# Patient Record
Sex: MALE | Race: WHITE | HISPANIC OR LATINO | Employment: FULL TIME | URBAN - METROPOLITAN AREA
[De-identification: names, ages, dates, MRNs, and addresses within clinical notes are randomized per-mention and may not be internally consistent; named-entity substitution may affect disease eponyms.]

---

## 2021-06-01 ENCOUNTER — RECORDS - HEALTHEAST (OUTPATIENT)
Dept: ADMINISTRATIVE | Facility: CLINIC | Age: 21
End: 2021-06-01

## 2022-11-23 ENCOUNTER — ALLIED HEALTH/NURSE VISIT (OUTPATIENT)
Dept: FAMILY MEDICINE | Facility: CLINIC | Age: 22
End: 2022-11-23

## 2022-11-23 DIAGNOSIS — Z48.02 ENCOUNTER FOR REMOVAL OF SUTURES: Primary | ICD-10-CM

## 2022-11-23 PROCEDURE — 99207 PR NO CHARGE NURSE ONLY: CPT

## 2022-11-23 NOTE — PROGRESS NOTES
Patient presents for suture removal.  Placed on 11/17 and instructed to have removed in 7-10 days.  Today is day 6, reviewed signs and symptoms of infection and assessed wound which was well approximated and without any signs of infection.  Rescheduled patient to return in 2 days for removal.  Patient verbalized understanding.

## 2022-11-25 ENCOUNTER — ALLIED HEALTH/NURSE VISIT (OUTPATIENT)
Dept: FAMILY MEDICINE | Facility: CLINIC | Age: 22
End: 2022-11-25
Payer: COMMERCIAL

## 2022-11-25 DIAGNOSIS — Z48.02 ENCOUNTER FOR REMOVAL OF SUTURES: Primary | ICD-10-CM

## 2022-11-25 PROCEDURE — 99207 PR NO CHARGE NURSE ONLY: CPT

## 2022-11-25 NOTE — PROGRESS NOTES
Chacho Baltazarero presents to the clinic for removal of sutures and sutures,staples, steri strips. The patient has had sutures in place for 8 days. There has been no patient reported signs or symptoms of infection or drainage. 3  sutures and sutures,staples, staple, steri strips are seen and located on the right anterior elbow. Tetanus status is up to date. All sutures and sutures,staples, steri strips were easily removed today. Routine wound care discussed by the RN or provider. The patient will follow up as needed.

## 2024-02-19 ENCOUNTER — OFFICE VISIT (OUTPATIENT)
Dept: FAMILY MEDICINE | Facility: CLINIC | Age: 24
End: 2024-02-19

## 2024-02-19 VITALS
RESPIRATION RATE: 18 BRPM | SYSTOLIC BLOOD PRESSURE: 108 MMHG | HEART RATE: 66 BPM | OXYGEN SATURATION: 99 % | WEIGHT: 144.7 LBS | DIASTOLIC BLOOD PRESSURE: 70 MMHG | TEMPERATURE: 98.3 F

## 2024-02-19 DIAGNOSIS — L50.9 HIVES: Primary | ICD-10-CM

## 2024-02-19 PROCEDURE — 99203 OFFICE O/P NEW LOW 30 MIN: CPT | Performed by: NURSE PRACTITIONER

## 2024-02-19 RX ORDER — CETIRIZINE HYDROCHLORIDE 10 MG/1
10 TABLET ORAL DAILY PRN
Qty: 30 TABLET | Refills: 0 | Status: SHIPPED | OUTPATIENT
Start: 2024-02-19

## 2024-02-19 NOTE — PATIENT INSTRUCTIONS
See a doctor if not better in 2 weeks     Cetirizine daily as needed     Is also over the counter if you need it and run out.     Come back if severe/worse

## 2024-02-19 NOTE — PROGRESS NOTES
Assessment & Plan     Hives    - cetirizine (ZYRTEC) 10 MG tablet  Dispense: 30 tablet; Refill: 0     Patient with urticarial appearing rash, very mild at this time, but persistent for 1 week.  No illness symptoms.  Is moving around, changing locations.  Pruritic.  Recommend daily Zyrtec as needed.  Currently is uninsured.  Can follow-up via MyChart or in person if very persistent for more than a couple weeks or worsening.           Return in about 2 weeks (around 3/4/2024) for If no better.    Mary Gray, [facility] LUZMARIA Flor is a 23 year old male who presents to clinic today for the following health issues:  Chief Complaint   Patient presents with    Rash     X 1wk, on holds of skin (armpit, inner elbow, inner knee, groin area), no blisters, no fever, itchiness     HPI    1 week of pruritic rash, changing locations.  Started in the flexural areas and has been moving around, now worse in the thighs and axilla area.    No illness sx. no history of eczema.    No known allergies.      No new pets.      Has tried anti-itch cream.     Denies throat itching, swelling or wheezing.        Review of Systems    See HPI        Objective    /70   Pulse 66   Temp 98.3  F (36.8  C) (Oral)   Resp 18   Wt 65.6 kg (144 lb 11.2 oz)   SpO2 99%   Physical Exam  Constitutional:       Appearance: He is well-developed.   HENT:      Right Ear: External ear normal.      Left Ear: External ear normal.   Eyes:      General:         Right eye: No discharge.         Left eye: No discharge.      Conjunctiva/sclera: Conjunctivae normal.   Pulmonary:      Effort: Pulmonary effort is normal.   Musculoskeletal:         General: Normal range of motion.   Skin:     General: Skin is warm.      Comments: Erythematous, raised, light pink maculopapular rash, most prominent currently in the right axilla and upper thigh areas    Multiple areas of scratch marks without rash.  Patient states  similar rash had previously been there.    No sign of secondary infection.   Neurological:      Mental Status: He is alert and oriented to person, place, and time.   Psychiatric:         Mood and Affect: Mood normal.         Behavior: Behavior normal.         Thought Content: Thought content normal.         Judgment: Judgment normal.

## 2024-06-02 ENCOUNTER — OFFICE VISIT (OUTPATIENT)
Dept: FAMILY MEDICINE | Facility: CLINIC | Age: 24
End: 2024-06-02
Payer: COMMERCIAL

## 2024-06-02 VITALS
HEIGHT: 66 IN | BODY MASS INDEX: 25.22 KG/M2 | DIASTOLIC BLOOD PRESSURE: 71 MMHG | WEIGHT: 156.9 LBS | SYSTOLIC BLOOD PRESSURE: 108 MMHG | TEMPERATURE: 98.5 F | HEART RATE: 52 BPM | RESPIRATION RATE: 18 BRPM | OXYGEN SATURATION: 100 %

## 2024-06-02 DIAGNOSIS — L08.9 LACERATION OF FINGER WITH INFECTION, INITIAL ENCOUNTER: ICD-10-CM

## 2024-06-02 DIAGNOSIS — L03.012 CELLULITIS OF FINGER OF LEFT HAND: Primary | ICD-10-CM

## 2024-06-02 DIAGNOSIS — S61.219A LACERATION OF FINGER WITH INFECTION, INITIAL ENCOUNTER: ICD-10-CM

## 2024-06-02 PROCEDURE — 99214 OFFICE O/P EST MOD 30 MIN: CPT | Performed by: NURSE PRACTITIONER

## 2024-06-02 RX ORDER — CEPHALEXIN 500 MG/1
500 CAPSULE ORAL 2 TIMES DAILY
Qty: 14 CAPSULE | Refills: 0 | Status: SHIPPED | OUTPATIENT
Start: 2024-06-02 | End: 2024-06-09

## 2024-06-02 NOTE — PROGRESS NOTES
Patient presents with:  Musculoskeletal Problem: Cut on ring finger about a week ago, was doing yard work. He was keeping it bandage but it was in concrete from work yesterday now swollen      Clinical Decision Making: Focused exam positive for left hand with 2.5cm lateral laceration at base of ring finger, palmar aspect.  Wound well-approximated, with healing present; erythemic appearance and swollen.  No obvious fluctuation or drainage noted.FROM of digit with no palpable point tenderness at DIP, PIP or metatarsal aspect.    Clinical presentation and medical decision making consistent with cellulitis of ring finger, left hand.  Given mechanism of injury and contamination will prophylactically treat with an antibiotic course.  Encouraged patient to keep area clean with warm water and soap, avoid any other hydrogen peroxide/alcohol rinses at this time.  Keep wound open to air and avoid further contamination.  Discussed signs and symptoms of worsening infection and when to return for reevaluation, education provided; verbalized understanding.      ICD-10-CM    1. Cellulitis of finger of left hand  L03.012 cephALEXin (KEFLEX) 500 MG capsule      2. Laceration of finger with infection, initial encounter  S61.219A cephALEXin (KEFLEX) 500 MG capsule    L08.9           There are no Patient Instructions on file for this visit.    HPI: Chacho Betancur is a 23 year old male who presents today complaining of laceration to ring finger sustained a week ago while doing yard work with a weed All. Patient reports keeping wound bandaged however was working in concrete yesterday and noticed contamination of wound due to slippage of bandages.  Reports initially rinsing with hydrogen peroxide as well as alcohol soaks, pat dry.  However now has significant swelling, redness and warmth in the area.  Denies any systemic fevers, chills or myalgia symptoms. Reports last Tdap was 11/2022.  Lateral laceration along the base of ring  "finger, palmar surface    History obtained from the patient.    Problem List:  There are no relevant problems documented for this patient.      No past medical history on file.    Social History     Tobacco Use    Smoking status: Never    Smokeless tobacco: Never    Tobacco comments:     vaping   Substance Use Topics    Alcohol use: Not on file       Review of Systems  As noted in HPI    Vitals:    06/02/24 1033   BP: 108/71   Pulse: 52   Resp: 18   Temp: 98.5  F (36.9  C)   SpO2: 100%   Weight: 71.2 kg (156 lb 14.4 oz)   Height: 1.676 m (5' 6\")       Physical Exam  Constitutional:       Appearance: Normal appearance.   Cardiovascular:      Rate and Rhythm: Normal rate and regular rhythm.      Pulses: Normal pulses.      Heart sounds: Normal heart sounds.   Pulmonary:      Effort: Pulmonary effort is normal.      Breath sounds: Normal breath sounds.   Musculoskeletal:      Comments: Left hand with 2.5cm lateral laceration at base of ring finger, palmar aspect.  Wound well-approximated, with healing present; erythemic appearance and swollen.  No obvious fluctuation or drainage noted.FROM of digit with no palpable point tenderness at DIP, PIP or metatarsal aspect.     Skin:     Capillary Refill: Capillary refill takes less than 2 seconds.      Comments: As noted in [facility]     Neurological:      Mental Status: He is alert and oriented to person, place, and time.      Sensory: No sensory deficit.      Motor: No weakness.      Deep Tendon Reflexes: Reflexes normal.         Labs:  No results found for any visits on 06/02/24.      At the end of the encounter, I discussed results, diagnosis, medications. Discussed red flags for immediate return to clinic/ER, as well as indications for follow up if no improvement. Patient understood and agreed to plan.     JACKELINE De Santiago CNP    "